# Patient Record
Sex: MALE | Race: WHITE | NOT HISPANIC OR LATINO | Employment: UNEMPLOYED | URBAN - NONMETROPOLITAN AREA
[De-identification: names, ages, dates, MRNs, and addresses within clinical notes are randomized per-mention and may not be internally consistent; named-entity substitution may affect disease eponyms.]

---

## 2023-06-25 ENCOUNTER — HOSPITAL ENCOUNTER (EMERGENCY)
Facility: HOSPITAL | Age: 28
Discharge: HOME | End: 2023-06-25
Attending: EMERGENCY MEDICINE
Payer: COMMERCIAL

## 2023-06-25 ENCOUNTER — APPOINTMENT (EMERGENCY)
Dept: CT IMAGING | Facility: HOSPITAL | Age: 28
End: 2023-06-25
Payer: COMMERCIAL

## 2023-06-25 ENCOUNTER — APPOINTMENT (EMERGENCY)
Dept: ULTRASOUND IMAGING | Facility: HOSPITAL | Age: 28
End: 2023-06-25
Payer: COMMERCIAL

## 2023-06-25 VITALS
HEIGHT: 72 IN | SYSTOLIC BLOOD PRESSURE: 123 MMHG | HEART RATE: 82 BPM | OXYGEN SATURATION: 95 % | WEIGHT: 231.26 LBS | TEMPERATURE: 97.2 F | BODY MASS INDEX: 31.32 KG/M2 | RESPIRATION RATE: 16 BRPM | DIASTOLIC BLOOD PRESSURE: 84 MMHG

## 2023-06-25 DIAGNOSIS — N45.2: Primary | ICD-10-CM

## 2023-06-25 LAB
ANION GAP SERPL CALCULATED.3IONS-SCNC: 8 MMOL/L
BACTERIA UR QL AUTO: ABNORMAL /HPF
BASOPHILS # BLD AUTO: 0.07 THOUSANDS/ÂΜL (ref 0–0.1)
BASOPHILS NFR BLD AUTO: 1 % (ref 0–1)
BILIRUB UR QL STRIP: ABNORMAL
BUN SERPL-MCNC: 16 MG/DL (ref 5–25)
CALCIUM SERPL-MCNC: 10 MG/DL (ref 8.4–10.2)
CHLORIDE SERPL-SCNC: 105 MMOL/L (ref 96–108)
CLARITY UR: ABNORMAL
CO2 SERPL-SCNC: 24 MMOL/L (ref 21–32)
COLOR UR: ABNORMAL
CREAT SERPL-MCNC: 0.98 MG/DL (ref 0.6–1.3)
EOSINOPHIL # BLD AUTO: 0.12 THOUSAND/ÂΜL (ref 0–0.61)
EOSINOPHIL NFR BLD AUTO: 1 % (ref 0–6)
ERYTHROCYTE [DISTWIDTH] IN BLOOD BY AUTOMATED COUNT: 12 % (ref 11.6–15.1)
GFR SERPL CREATININE-BSD FRML MDRD: 104 ML/MIN/1.73SQ M
GLUCOSE SERPL-MCNC: 93 MG/DL (ref 65–140)
GLUCOSE UR STRIP-MCNC: NEGATIVE MG/DL
HCT VFR BLD AUTO: 46.9 % (ref 36.5–49.3)
HGB BLD-MCNC: 16.3 G/DL (ref 12–17)
HGB UR QL STRIP.AUTO: NEGATIVE
IMM GRANULOCYTES # BLD AUTO: 0.03 THOUSAND/UL (ref 0–0.2)
IMM GRANULOCYTES NFR BLD AUTO: 0 % (ref 0–2)
KETONES UR STRIP-MCNC: NEGATIVE MG/DL
LEUKOCYTE ESTERASE UR QL STRIP: NEGATIVE
LYMPHOCYTES # BLD AUTO: 3.08 THOUSANDS/ÂΜL (ref 0.6–4.47)
LYMPHOCYTES NFR BLD AUTO: 34 % (ref 14–44)
MCH RBC QN AUTO: 31.5 PG (ref 26.8–34.3)
MCHC RBC AUTO-ENTMCNC: 34.8 G/DL (ref 31.4–37.4)
MCV RBC AUTO: 91 FL (ref 82–98)
MONOCYTES # BLD AUTO: 0.73 THOUSAND/ÂΜL (ref 0.17–1.22)
MONOCYTES NFR BLD AUTO: 8 % (ref 4–12)
MUCOUS THREADS UR QL AUTO: ABNORMAL
NEUTROPHILS # BLD AUTO: 5 THOUSANDS/ÂΜL (ref 1.85–7.62)
NEUTS SEG NFR BLD AUTO: 56 % (ref 43–75)
NITRITE UR QL STRIP: NEGATIVE
NON-SQ EPI CELLS URNS QL MICRO: ABNORMAL /HPF
NRBC BLD AUTO-RTO: 0 /100 WBCS
PH UR STRIP.AUTO: 5.5 [PH]
PLATELET # BLD AUTO: 242 THOUSANDS/UL (ref 149–390)
PMV BLD AUTO: 11.2 FL (ref 8.9–12.7)
POTASSIUM SERPL-SCNC: 4.4 MMOL/L (ref 3.5–5.3)
PROT UR STRIP-MCNC: ABNORMAL MG/DL
RBC # BLD AUTO: 5.17 MILLION/UL (ref 3.88–5.62)
RBC #/AREA URNS AUTO: ABNORMAL /HPF
SODIUM SERPL-SCNC: 137 MMOL/L (ref 135–147)
SP GR UR STRIP.AUTO: >=1.03 (ref 1–1.03)
UROBILINOGEN UR QL STRIP.AUTO: 1 E.U./DL
WBC # BLD AUTO: 9.03 THOUSAND/UL (ref 4.31–10.16)
WBC #/AREA URNS AUTO: ABNORMAL /HPF

## 2023-06-25 PROCEDURE — 87491 CHLMYD TRACH DNA AMP PROBE: CPT | Performed by: PHYSICIAN ASSISTANT

## 2023-06-25 PROCEDURE — 96374 THER/PROPH/DIAG INJ IV PUSH: CPT

## 2023-06-25 PROCEDURE — 81001 URINALYSIS AUTO W/SCOPE: CPT | Performed by: PHYSICIAN ASSISTANT

## 2023-06-25 PROCEDURE — G1004 CDSM NDSC: HCPCS

## 2023-06-25 PROCEDURE — 85025 COMPLETE CBC W/AUTO DIFF WBC: CPT | Performed by: PHYSICIAN ASSISTANT

## 2023-06-25 PROCEDURE — 96372 THER/PROPH/DIAG INJ SC/IM: CPT

## 2023-06-25 PROCEDURE — 87591 N.GONORRHOEAE DNA AMP PROB: CPT | Performed by: PHYSICIAN ASSISTANT

## 2023-06-25 PROCEDURE — 76870 US EXAM SCROTUM: CPT

## 2023-06-25 PROCEDURE — 74176 CT ABD & PELVIS W/O CONTRAST: CPT

## 2023-06-25 PROCEDURE — 80048 BASIC METABOLIC PNL TOTAL CA: CPT | Performed by: PHYSICIAN ASSISTANT

## 2023-06-25 PROCEDURE — 99283 EMERGENCY DEPT VISIT LOW MDM: CPT

## 2023-06-25 PROCEDURE — 36415 COLL VENOUS BLD VENIPUNCTURE: CPT | Performed by: PHYSICIAN ASSISTANT

## 2023-06-25 RX ORDER — DOXYCYCLINE HYCLATE 100 MG/1
100 CAPSULE ORAL ONCE
Status: COMPLETED | OUTPATIENT
Start: 2023-06-25 | End: 2023-06-25

## 2023-06-25 RX ORDER — NAPROXEN 500 MG/1
500 TABLET ORAL 2 TIMES DAILY WITH MEALS
Qty: 14 TABLET | Refills: 0 | Status: SHIPPED | OUTPATIENT
Start: 2023-06-25 | End: 2023-07-02

## 2023-06-25 RX ORDER — NAPROXEN 500 MG/1
500 TABLET ORAL 2 TIMES DAILY WITH MEALS
Qty: 14 TABLET | Refills: 0 | Status: SHIPPED | OUTPATIENT
Start: 2023-06-25 | End: 2023-06-25 | Stop reason: SDUPTHER

## 2023-06-25 RX ORDER — MORPHINE SULFATE 4 MG/ML
4 INJECTION, SOLUTION INTRAMUSCULAR; INTRAVENOUS ONCE
Status: COMPLETED | OUTPATIENT
Start: 2023-06-25 | End: 2023-06-25

## 2023-06-25 RX ORDER — DOXYCYCLINE HYCLATE 100 MG/1
100 CAPSULE ORAL 2 TIMES DAILY
Qty: 14 CAPSULE | Refills: 0 | Status: SHIPPED | OUTPATIENT
Start: 2023-06-25 | End: 2023-06-25 | Stop reason: SDUPTHER

## 2023-06-25 RX ORDER — DOXYCYCLINE HYCLATE 100 MG/1
100 CAPSULE ORAL 2 TIMES DAILY
Qty: 14 CAPSULE | Refills: 0 | Status: SHIPPED | OUTPATIENT
Start: 2023-06-25 | End: 2023-07-02

## 2023-06-25 RX ADMIN — LIDOCAINE HYDROCHLORIDE 500 MG: 10 INJECTION, SOLUTION EPIDURAL; INFILTRATION; INTRACAUDAL; PERINEURAL at 14:41

## 2023-06-25 RX ADMIN — MORPHINE SULFATE 4 MG: 4 INJECTION INTRAVENOUS at 11:27

## 2023-06-25 RX ADMIN — MORPHINE SULFATE 4 MG: 4 INJECTION, SOLUTION INTRAMUSCULAR; INTRAVENOUS at 11:57

## 2023-06-25 RX ADMIN — DOXYCYCLINE 100 MG: 100 CAPSULE ORAL at 14:41

## 2023-06-25 NOTE — ED PROVIDER NOTES
"History  Chief Complaint   Patient presents with   • Groin Pain     Right sided groin that started this morning  Pt  Arrived EMS they administered 15mg IM Tordol prior to arrival to ED       25-year-old male presents the emergency department for evaluation of right-sided testicular pain  Patient states he was walking home from [place] when he had sudden onset of excruciating \"right nut pain  \"  Patient called 911  Received 15 mg IM Toradol in route without improvement of symptoms  Patient does have history of kidney stones  States this feels different  States he was urinating regularly  Denies any recent fevers or chills  Denies vomiting  Reports mild nausea  None       Past Medical History:   Diagnosis Date   • Kidney stones        History reviewed  No pertinent surgical history  History reviewed  No pertinent family history  I have reviewed and agree with the history as documented  E-Cigarette/Vaping     E-Cigarette/Vaping Substances     Social History     Tobacco Use   • Smoking status: Every Day     Types: Cigarettes   Substance Use Topics   • Drug use: Yes     Types: Marijuana       Review of Systems   Constitutional: Negative  Negative for appetite change, fatigue and fever  Respiratory: Negative  Negative for shortness of breath  Cardiovascular: Negative  Gastrointestinal: Positive for nausea  Negative for abdominal pain and vomiting  Genitourinary: Positive for testicular pain  Negative for penile pain, penile swelling and scrotal swelling  Right groin pain   Musculoskeletal: Negative  Skin: Negative  All other systems reviewed and are negative  Physical Exam  Physical Exam  Vitals and nursing note reviewed  Exam conducted with a chaperone present (Brain RN)  Constitutional:       General: He is in acute distress  Appearance: Normal appearance  He is not toxic-appearing or diaphoretic        Comments: Rocking back and forth, tearful, holding the " right testicle   HENT:      Head: Normocephalic  Nose: Nose normal    Eyes:      Conjunctiva/sclera: Conjunctivae normal    Cardiovascular:      Rate and Rhythm: Normal rate and regular rhythm  Pulmonary:      Effort: Pulmonary effort is normal  No respiratory distress  Breath sounds: Normal breath sounds  No stridor  No wheezing, rhonchi or rales  Chest:      Chest wall: No tenderness  Abdominal:      General: Abdomen is flat  Bowel sounds are normal  There is no distension  Palpations: Abdomen is soft  Tenderness: There is abdominal tenderness (Right lower quadrant abdominal tenderness)  There is no right CVA tenderness, left CVA tenderness or guarding  Genitourinary:     Testes:         Right: Tenderness and swelling present  Left: Swelling present  Tenderness not present  Skin:     General: Skin is warm and dry  Neurological:      General: No focal deficit present  Mental Status: He is alert           Vital Signs  ED Triage Vitals   Temperature Pulse Respirations Blood Pressure SpO2   06/25/23 1101 06/25/23 1101 06/25/23 1101 06/25/23 1101 06/25/23 1101   (!) 97 2 °F (36 2 °C) 92 22 137/90 95 %      Temp Source Heart Rate Source Patient Position - Orthostatic VS BP Location FiO2 (%)   06/25/23 1101 06/25/23 1101 06/25/23 1101 06/25/23 1101 --   Tympanic Monitor Lying Right arm       Pain Score       06/25/23 1155       10 - Worst Possible Pain           Vitals:    06/25/23 1101 06/25/23 1303 06/25/23 1330 06/25/23 1415   BP: 137/90 144/78 122/78 123/84   Pulse: 92 82 61 82   Patient Position - Orthostatic VS: Lying            Visual Acuity      ED Medications  Medications   cefTRIAXone (ROCEPHIN) 500 mg in lidocaine (PF) (XYLOCAINE-MPF) 1 % IM only syringe (has no administration in time range)   doxycycline hyclate (VIBRAMYCIN) capsule 100 mg (has no administration in time range)   morphine injection 4 mg (4 mg Intravenous Given 6/25/23 1127)   morphine injection 4 mg (4 mg Intravenous Given 6/25/23 1157)       Diagnostic Studies  Results Reviewed     Procedure Component Value Units Date/Time    Urine Microscopic [[id number]]  (Abnormal) Collected: 06/25/23 1303    Lab Status: Final result Specimen: Urine, Clean Catch Updated: 06/25/23 1317     RBC, UA 2-4 /hpf      WBC, UA 0-5 /hpf      Epithelial Cells Occasional /hpf      Bacteria, UA Moderate /hpf      MUCUS THREADS Moderate    UA w Reflex to Microscopic w Reflex to Culture [[id number]]  (Abnormal) Collected: 06/25/23 1303    Lab Status: Final result Specimen: Urine, Clean Catch Updated: 06/25/23 1311     Color, UA Nataly     Clarity, UA Slightly Cloudy     Specific Gravity, UA >=1 030     pH, UA 5 5     Leukocytes, UA Negative     Nitrite, UA Negative     Protein, UA Trace mg/dl      Glucose, UA Negative mg/dl      Ketones, UA Negative mg/dl      Urobilinogen, UA 1 0 E U /dl      Bilirubin, UA Small     Occult Blood, UA Negative    Chlamydia/GC amplified DNA by PCR [[id number]] Collected: 06/25/23 1302    Lab Status:  In process Specimen: Urine, Other Updated: 06/25/23 3709    Basic metabolic panel [[id number]] Collected: 06/25/23 1128    Lab Status: Final result Specimen: Blood from Hand, Right Updated: 06/25/23 1150     Sodium 137 mmol/L      Potassium 4 4 mmol/L      Chloride 105 mmol/L      CO2 24 mmol/L      ANION GAP 8 mmol/L      BUN 16 mg/dL      Creatinine 0 98 mg/dL      Glucose 93 mg/dL      Calcium 10 0 mg/dL      eGFR 104 ml/min/1 73sq m     Narrative:      [place] guidelines for Chronic Kidney Disease (CKD):   •  Stage 1 with normal or high GFR (GFR > 90 mL/min/1 73 square meters)  •  Stage 2 Mild CKD (GFR = 60-89 mL/min/1 73 square meters)  •  Stage 3A Moderate CKD (GFR = 45-59 mL/min/1 73 square meters)  •  Stage 3B Moderate CKD (GFR = 30-44 mL/min/1 73 square meters)  •  Stage 4 Severe CKD (GFR = 15-29 mL/min/1 73 square meters)  •  Stage 5 End Stage CKD (GFR <15 mL/min/1 73 square meters)  Note: GFR calculation is accurate only with a steady state creatinine    CBC and differential [[id number]] Collected: 06/25/23 1128    Lab Status: Final result Specimen: Blood from Hand, Right Updated: 06/25/23 1133     WBC 9 03 Thousand/uL      RBC 5 17 Million/uL      Hemoglobin 16 3 g/dL      Hematocrit 46 9 %      MCV 91 fL      MCH 31 5 pg      MCHC 34 8 g/dL      RDW 12 0 %      MPV 11 2 fL      Platelets 819 Thousands/uL      nRBC 0 /100 WBCs      Neutrophils Relative 56 %      Immat GRANS % 0 %      Lymphocytes Relative 34 %      Monocytes Relative 8 %      Eosinophils Relative 1 %      Basophils Relative 1 %      Neutrophils Absolute 5 00 Thousands/µL      Immature Grans Absolute 0 03 Thousand/uL      Lymphocytes Absolute 3 08 Thousands/µL      Monocytes Absolute 0 73 Thousand/µL      Eosinophils Absolute 0 12 Thousand/µL      Basophils Absolute 0 07 Thousands/µL                  CT renal stone study abdomen pelvis wo contrast   Final Result by Dannielle Irizarry MD (06/25 1425)   No acute inflammatory changes in the abdomen or pelvis  Workstation performed: [id number]         US scrotum and testicles   Final Result by Loreto Elliott MD (06/25 1244)      Hyperemia of the right and left testis suggestive of bilateral orchitis  The study was marked in [facility] for immediate notification  Workstation performed: [id number]                    Procedures  Procedures         ED Course  ED Course as of 06/25/23 1440   Sun Jun 25, 2023   1138 WBC: 9 03   1247 US: Hyperemia of the right and left testis suggestive of bilateral orchitis  1259 Patient denies concern for STD however request testing  GC chlamydia added onto urine test   1320 Blood, UA: Negative   1320 Trait negative, leuk negative urine  Low concern for UTI    1353 Patient requesting additional pain medication  States pain is returning    1429 CT renal stone: No acute inflammatory changes in the abdomen or pelvis  1438 I discussed all results and findings with the patient  We discussed symptomatic treatment at home and return precautions  We discussed appropriate follow-up with urology and patient verbalized understanding  He was clinically hemodynamically stable for discharge                 SBIRT 20yo+    Flowsheet Row Most Recent Value   Initial Alcohol Screen: US AUDIT-C     1  How often do you have a drink containing alcohol? 0 Filed at: 06/25/2023 1105   2  How many drinks containing alcohol do you have on a typical day you are drinking? 0 Filed at: 06/25/2023 1105   3a  Male UNDER 65: How often do you have five or more drinks on one occasion? 0 Filed at: 06/25/2023 1105   3b  FEMALE Any Age, or MALE 65+: How often do you have 4 or more drinks on one occassion? 0 Filed at: 06/25/2023 1105   Audit-C Score 0 Filed at: 06/25/2023 1105   TARI: How many times in the past year have you    Used an illegal drug or used a prescription medication for non-medical reasons? Never Filed at: 06/25/2023 1105                    Medical Decision Making  79-year-old male presented to the emergency department for evaluation of sudden onset right sided testicular pain while walking home from [facility]  Vitals and medical record reviewed  Patient with both bilateral testicular swelling on exam right-sided tenderness  Some right lower quadrant abdominal tenderness on exam   Differential considerations include but not limited to testicular torsion, epididymitis, orchitis, kidney stone  Laboratory findings relatively unremarkable  UA negative for UTI  CT scan negative for acute inflammatory abnormality  Ultrasound noted for bilateral orchitis no evidence of testicular torsion  Patient will be treated with ceftriaxone and doxycycline  Will follow-up with urology  Return precautions were discussed and he verbalized understanding    He was clinically and hemodynamically stable for discharge    Orchitis, bilateral: acute illness or injury  Amount and/or Complexity of Data Reviewed  Labs: ordered  Decision-making details documented in ED Course  Radiology: ordered  Risk  Prescription drug management  Disposition  Final diagnoses:   Orchitis, bilateral     Time reflects when diagnosis was documented in both MDM as applicable and the Disposition within this note     Time User Action Codes Description Comment    6/25/2023  1:46 PM Jacqueline Mom Add [N45 2] Orchitis, bilateral       ED Disposition     ED Disposition   Discharge    Condition   Stable    Date/Time   Sun Jun 25, 2023  1:46 PM    Comment   Elidia Yang discharge to home/self care                 Follow-up Information     Follow up With Specialties Details Why Yadira Patton 52, DO Family Medicine   [facility] [address]      Elsa Tucker MD Urology   [address] [id number] [phone]            Patient's Medications   Discharge Prescriptions    DOXYCYCLINE HYCLATE (VIBRAMYCIN) 100 MG CAPSULE    Take 1 capsule (100 mg total) by mouth 2 (two) times a day for 7 days       Start Date: 6/25/2023 End Date: 7/2/2023       Order Dose: 100 mg       Quantity: 14 capsule    Refills: 0    NAPROXEN (NAPROSYN) 500 MG TABLET    Take 1 tablet (500 mg total) by mouth 2 (two) times a day with meals for 7 days       Start Date: 6/25/2023 End Date: 7/2/2023       Order Dose: 500 mg       Quantity: 14 tablet    Refills: 0           PDMP Review     None          ED Provider  Electronically Signed by           Michele Alexis PA-C  06/25/23 9137

## 2023-06-25 NOTE — DISCHARGE INSTRUCTIONS
Please take antibiotics as prescribed  Follow-up with urology    Return with new or worsening symptoms

## 2023-06-25 NOTE — ED NOTES
Pt in no acute distress  Ambulates with a steady gait   Verbalizes understanding of discharge instructions       Mary Ellen Perdomo RN  06/25/23 0356

## 2023-06-26 ENCOUNTER — TELEPHONE (OUTPATIENT)
Dept: UROLOGY | Facility: AMBULATORY SURGERY CENTER | Age: 28
End: 2023-06-26

## 2023-06-26 PROBLEM — N45.2 ORCHITIS OF BOTH TESTICLES: Status: ACTIVE | Noted: 2023-06-26

## 2023-06-26 PROBLEM — Z87.442 HISTORY OF RENAL CALCULI: Status: ACTIVE | Noted: 2023-06-26

## 2023-06-26 LAB
C TRACH DNA SPEC QL NAA+PROBE: NEGATIVE
N GONORRHOEA DNA SPEC QL NAA+PROBE: NEGATIVE

## 2023-06-26 NOTE — PROGRESS NOTES
UROLOGY PROGRESS NOTE         NAME: Chadd Yang  AGE: 28 y o  SEX: male  : 1995   MRN: [id number]    DATE: 2023  TIME: 9:31 AM    Assessment and Plan   Procedures     Impression:   1  Orchitis of both testicles    2  History of renal calculi    3  Pain in right testicle    4  Erectile dysfunction, unspecified erectile dysfunction type         Plan: Likely his acute orchitis may be bacterial related night told him to finish the doxycycline for a week prescribed by the ER  Regarding his chronic right testicle pain he gets likely neuropathic and I am going to recommend Elavil 25 mg  Regarding erectile dysfunction we talked about trying sildenafil at a low dose as well as behavioral modification and issues with performance anxiety  New follow-up 6 months to reevaluate      Chief Complaint   No chief complaint on file  History of Present Illness     HPI: Bienvenido Onofre is a 28y o  year old male who presents with follow-up from ER for bilateral orchitis  Patient had a negative UA negative GC and chlamydia  CT scan 2023 showed normal upper tracts no stones and no acute changes in the abdomen or pelvis  Scrotal ultrasound showed bilateral hyperemia of both testicles suggestive of bilateral orchitis  No testicular masses    According to the ER note 2023 patient reported right-sided testicular pain he was given a shot of Rocephin and was discharged with doxycycline 100 mg twice a day for a week  Patient states for a whole year he has been having right-sided orchialgia with right groin pain with exercise only  He also complains about erectile dysfunction  Including decreased firmness  He does smoke does not drink alcohol he has been with his girlfriend for 5 months      No pain with ejaculation no hematospermia          The following portions of the patient's history were reviewed and updated as appropriate: allergies, current medications, past family history, past medical history, past social history, past surgical history and problem list   Past Medical History:   Diagnosis Date   • Kidney stones      Past Surgical History:   Procedure Laterality Date   • HERNIA REPAIR     • MOUTH SURGERY     • TONSILLECTOMY       shoulder  Review of Systems     Const: Denies chills, fever and weight loss  CV: Denies chest pain  Resp: Denies SOB  GI: Denies abdominal pain, nausea and vomiting  : Denies symptoms other than stated above  Musculo: Denies back pain  Objective   /72 (BP Location: Right arm, Patient Position: Sitting, Cuff Size: Standard)   Pulse 81   Temp (!) 97 3 °F (36 3 °C)   Ht 6' (1 829 m)   Wt 109 kg (240 lb)   SpO2 97%   BMI 32 55 kg/m²     Physical Exam  Const: Appears healthy and well developed  No signs of acute distress present  Resp: Respirations are regular and unlabored  CV: Rate is regular  Rhythm is regular  Abdomen: Abdomen is soft, nontender, and nondistended  Kidneys are not palpable  : Left testicle normal right testicle normal but painful really no swelling  No groin hernias  Psych: Patient's attitude is cooperative   Mood is normal  Affect is normal     Current Medications     Current Outpatient Medications:   •  doxycycline hyclate (VIBRAMYCIN) 100 mg capsule, Take 1 capsule (100 mg total) by mouth 2 (two) times a day for 7 days, Disp: 14 capsule, Rfl: 0  •  naproxen (Naprosyn) 500 mg tablet, Take 1 tablet (500 mg total) by mouth 2 (two) times a day with meals for 7 days, Disp: 14 tablet, Rfl: 0        Arabella Gee MD

## 2023-06-26 NOTE — TELEPHONE ENCOUNTER
New Patient    What is the reason for the patient’s appointment?: NP- Orchitis, bilateral    What office location does the patient prefer?: [place]     Have patient records been requested?:  If No, are the records showing in Epic:     Records in epic from ED visit at [facility] ([facility]  Patient was also seen at another hospital in [place] which he is getting faxed over       INSURANCE:   Do we accept the patient's insurance or is the patient Self-Pay?: yes    Insurance Provider: [employer] Type/Number: [id number]  Member ID#: [id number]      HISTORY:   Has the patient had any previous Urologist(s)?: no     Was the patient seen in the ED?: yes 6/25/23    Has the patient had any outside testing done?: records being faxed in     Imaging from 6/25/23 in chart     Does the patient have a personal history of cancer?: no      CB: [phone]

## 2023-06-26 NOTE — TELEPHONE ENCOUNTER
Call transferred by psr  Patient reports he was seen in Ed yesterday [facility] b/l orchitis CT/US was prescribed doxycycline / Naproxen  and then went to [facility] later that day for evaluation due to pain  Appt given for tomorrow in our [place] [address] location for 6/27/23

## 2023-06-26 NOTE — TELEPHONE ENCOUNTER
Patient returning call  Patient stated both his testicles are swollen      Transferred call to Triage nurse Nai Henriquez

## 2023-06-27 ENCOUNTER — OFFICE VISIT (OUTPATIENT)
Dept: UROLOGY | Facility: CLINIC | Age: 28
End: 2023-06-27
Payer: COMMERCIAL

## 2023-06-27 VITALS
HEIGHT: 72 IN | BODY MASS INDEX: 32.51 KG/M2 | OXYGEN SATURATION: 97 % | DIASTOLIC BLOOD PRESSURE: 72 MMHG | SYSTOLIC BLOOD PRESSURE: 128 MMHG | WEIGHT: 240 LBS | TEMPERATURE: 97.3 F | HEART RATE: 81 BPM

## 2023-06-27 DIAGNOSIS — N52.9 ERECTILE DYSFUNCTION, UNSPECIFIED ERECTILE DYSFUNCTION TYPE: ICD-10-CM

## 2023-06-27 DIAGNOSIS — N50.811 PAIN IN RIGHT TESTICLE: ICD-10-CM

## 2023-06-27 DIAGNOSIS — Z87.442 HISTORY OF RENAL CALCULI: ICD-10-CM

## 2023-06-27 DIAGNOSIS — N45.2 ORCHITIS OF BOTH TESTICLES: Primary | ICD-10-CM

## 2023-06-27 PROCEDURE — 99204 OFFICE O/P NEW MOD 45 MIN: CPT | Performed by: UROLOGY

## 2023-06-27 RX ORDER — SILDENAFIL CITRATE 20 MG/1
TABLET ORAL
Qty: 30 TABLET | Refills: 1 | Status: SHIPPED | OUTPATIENT
Start: 2023-06-27

## 2023-06-27 RX ORDER — AMITRIPTYLINE HYDROCHLORIDE 25 MG/1
25 TABLET, FILM COATED ORAL
Qty: 90 TABLET | Refills: 3 | Status: SHIPPED | OUTPATIENT
Start: 2023-06-27

## 2023-08-23 ENCOUNTER — NURSE TRIAGE (OUTPATIENT)
Age: 28
End: 2023-08-23

## 2023-08-23 NOTE — TELEPHONE ENCOUNTER
Patients wife calling in stating patient is having increasing abdominal pain. Call was transferred urology triage nurse for further triaging.
Additional Safety/Bands:

## 2023-08-23 NOTE — TELEPHONE ENCOUNTER
Pt seen a few weeks ago for swelling in testicles. Reports having abdominal pain for a couple days. Reports when having a bowel movement felt like something popped and now having pain. Reports pain about 5 or 6. Reports also had some blood on tissue after bowel movement last week. Wife reports patient currently at work will have him call office to further triage. Unsure if maybe he needs gastro instead.

## 2023-08-24 ENCOUNTER — NURSE TRIAGE (OUTPATIENT)
Age: 28
End: 2023-08-24

## 2023-08-24 NOTE — TELEPHONE ENCOUNTER
Pt states pain was a 12 yesterday took Ibuprofen w/ out relief, his boss let him sit down at work & pain subsided. Pt still having constant pain to Rt testicle, using elavil. Pain has moved to abdomen area. Pt states he has trouble starting to urinate, while urinating he has to concentrate to keep it going, has weak stream. Has trouble with bowel movements. Last bowel movement today. Advised ER precautions. Per Romi Hayes aware of sx. Advised pt to go to ER. Pt aware states " ok I guess ill try to find a ride then". Phone call ended.

## 2023-08-24 NOTE — TELEPHONE ENCOUNTER
Returned call patient unavailable. Wife reports will be home in about 10 minutes. Will try call again in 15 minutes.

## 2023-09-07 PROBLEM — N50.3 EPIDIDYMAL CYST: Status: ACTIVE | Noted: 2023-09-07

## 2023-09-07 PROBLEM — N39.8 VOIDING DYSFUNCTION: Status: ACTIVE | Noted: 2023-09-07

## 2023-09-11 ENCOUNTER — TELEPHONE (OUTPATIENT)
Age: 28
End: 2023-09-11

## 2023-10-06 PROBLEM — N50.812 PAIN IN BOTH TESTICLES: Status: ACTIVE | Noted: 2023-06-27

## 2023-12-18 NOTE — PROGRESS NOTES
UROLOGY PROGRESS NOTE         NAME: Abdon Yang  AGE: 28 y.o. SEX: male  : 1995   MRN: [id number]    DATE: 2023  TIME: 6:33 PM    Assessment and Plan   Procedures     Impression:   1. History of orchitis    2. Pain in both testicles    3. Erectile dysfunction, unspecified erectile dysfunction type    4. History of renal calculi    5. Epididymal cyst         Plan: Patient would like opinion elsewhere, and I am happy to do that for him.  Will reach out to my colleagues at [facility] for chronic right testicle pain.  Hopefully someone there may be to consider a nerve block or possibly other nonsurgical options.      Chief Complaint   No chief complaint on file.    History of Present Illness     HPI: Abdon Yang is a 28 y.o. year old male who presents with a last office visit for me on 2023.  Patient with bilateral orchitis and right orchialgia, and a history of kidney stone and erectile dysfunction.  On the office visit the patient was to complete the doxycycline prescribed by the ER.  For his chronic right testicle pain I believe that was neuropathic and recommend Elavil 25 mg.    Regarding erectile dysfunction he was going to try sildenafil as well as behavioral modification with performance anxiety issues.  He is here for 6-month follow-up.  CT scan 2023 showed no stones no acute process scrotal ultrasound showed a small right epididymal cyst and multiple left epididymal cyst.  As well as right orchitis.  Patient states when he does not take the Elavil his pain is worse.  All in all still states even with the Elavil there is some relief for his right testicle pain but wants other alternatives.    Also has some performance anxiety still but is using as needed sildenafil.        The following portions of the patient's history were reviewed and updated as appropriate: allergies, current medications, past family history, past medical history, past social history, past surgical  history and problem list.  Past Medical History:   Diagnosis Date    Kidney stones      Past Surgical History:   Procedure Laterality Date    HERNIA REPAIR      MOUTH SURGERY      TONSILLECTOMY       shoulder  Review of Systems     Const: Denies chills, fever and weight loss.  CV: Denies chest pain.  Resp: Denies SOB.  GI: Denies abdominal pain, nausea and vomiting.  : Denies symptoms other than stated above.  Musculo: Denies back pain.    Objective   There were no vitals taken for this visit.    Physical Exam  Const: Appears healthy and well developed. No signs of acute distress present.  Resp: Respirations are regular and unlabored.   CV: Rate is regular. Rhythm is regular.  Abdomen: Abdomen is soft, nontender, and nondistended. Kidneys are not palpable.  : Normal external genitalia exam no hernias no focal testicular masses he had bilateral testicle pain to palpation left greater than right.  Psych: Patient's attitude is cooperative. Mood is normal. Affect is normal.    Current Medications     Current Outpatient Medications:     amitriptyline (ELAVIL) 25 mg tablet, Take 1 tablet (25 mg total) by mouth daily at bedtime, Disp: 90 tablet, Rfl: 3    naproxen (Naprosyn) 500 mg tablet, Take 1 tablet (500 mg total) by mouth 2 (two) times a day with meals for 7 days, Disp: 14 tablet, Rfl: 0    sildenafil (REVATIO) 20 mg tablet, Take anywhere from 1 to 5 tablets but no more than 5 tablets in a 24-hour period.  Do not drink alcohol with this product and take on empty stomach or wait 2 hours after eating to take the medication., Disp: 30 tablet, Rfl: 1        Aakash Cardoso MD

## 2023-12-27 ENCOUNTER — OFFICE VISIT (OUTPATIENT)
Dept: UROLOGY | Facility: CLINIC | Age: 28
End: 2023-12-27
Payer: COMMERCIAL

## 2023-12-27 VITALS
SYSTOLIC BLOOD PRESSURE: 118 MMHG | OXYGEN SATURATION: 97 % | WEIGHT: 245 LBS | DIASTOLIC BLOOD PRESSURE: 80 MMHG | HEART RATE: 88 BPM | TEMPERATURE: 98 F | HEIGHT: 72 IN | BODY MASS INDEX: 33.18 KG/M2

## 2023-12-27 DIAGNOSIS — Z87.438 HISTORY OF ORCHITIS: Primary | ICD-10-CM

## 2023-12-27 DIAGNOSIS — N50.812 PAIN IN BOTH TESTICLES: ICD-10-CM

## 2023-12-27 DIAGNOSIS — Z87.442 HISTORY OF RENAL CALCULI: ICD-10-CM

## 2023-12-27 DIAGNOSIS — N50.3 EPIDIDYMAL CYST: ICD-10-CM

## 2023-12-27 DIAGNOSIS — N50.811 PAIN IN RIGHT TESTICLE: ICD-10-CM

## 2023-12-27 DIAGNOSIS — N50.811 PAIN IN BOTH TESTICLES: ICD-10-CM

## 2023-12-27 DIAGNOSIS — N52.9 ERECTILE DYSFUNCTION, UNSPECIFIED ERECTILE DYSFUNCTION TYPE: ICD-10-CM

## 2023-12-27 DIAGNOSIS — N45.2: ICD-10-CM

## 2023-12-27 PROCEDURE — 99213 OFFICE O/P EST LOW 20 MIN: CPT | Performed by: UROLOGY

## 2023-12-27 RX ORDER — SILDENAFIL CITRATE 20 MG/1
TABLET ORAL
Qty: 30 TABLET | Refills: 1 | Status: SHIPPED | OUTPATIENT
Start: 2023-12-27 | End: 2024-01-03 | Stop reason: SDUPTHER

## 2023-12-27 RX ORDER — AMITRIPTYLINE HYDROCHLORIDE 25 MG/1
25 TABLET, FILM COATED ORAL
Qty: 90 TABLET | Refills: 3 | Status: SHIPPED | OUTPATIENT
Start: 2023-12-27 | End: 2024-01-03 | Stop reason: SDUPTHER

## 2024-01-02 ENCOUNTER — TELEPHONE (OUTPATIENT)
Age: 29
End: 2024-01-02

## 2024-01-02 DIAGNOSIS — N52.9 ERECTILE DYSFUNCTION, UNSPECIFIED ERECTILE DYSFUNCTION TYPE: ICD-10-CM

## 2024-01-02 DIAGNOSIS — N50.811 PAIN IN RIGHT TESTICLE: ICD-10-CM

## 2024-01-02 NOTE — TELEPHONE ENCOUNTER
Abdon    Urology    Patient checking on medications, it went to the wrong one, but he will  anyway and next time send to a new pharmacy.

## 2024-01-03 RX ORDER — SILDENAFIL CITRATE 20 MG/1
TABLET ORAL
Qty: 30 TABLET | Refills: 1 | Status: SHIPPED | OUTPATIENT
Start: 2024-01-03

## 2024-01-03 RX ORDER — AMITRIPTYLINE HYDROCHLORIDE 25 MG/1
25 TABLET, FILM COATED ORAL
Qty: 90 TABLET | Refills: 3 | Status: SHIPPED | OUTPATIENT
Start: 2024-01-03

## 2024-01-03 NOTE — TELEPHONE ENCOUNTER
Received fax request for sildenafil. Advised pt he can pay out of pocket for medication & use good [facility]. Pt verbalized understanding. Would like scripts sent to [place] [facility].

## 2024-03-01 ENCOUNTER — TELEPHONE (OUTPATIENT)
Age: 29
End: 2024-03-01

## 2024-03-01 NOTE — TELEPHONE ENCOUNTER
Patient was referred to [facility]. A fax was received requesting patient information. It is scanned into the media tab.     Did not speak to [facility] directly. Spoke to a [facility] letting the office know the fax was scanned into the chart.     Can you please give them a call? Or direct them to medical records.

## 2024-03-19 ENCOUNTER — TELEPHONE (OUTPATIENT)
Age: 29
End: 2024-03-19

## 2024-03-19 NOTE — TELEPHONE ENCOUNTER
Yolande from [facility] calling to inform the pt is transferring care to their practice and they would like all  urology notes and imaging reports sent to them directly.     Fax: [phone]  [facility] call back: [phone]